# Patient Record
Sex: MALE | Race: WHITE | NOT HISPANIC OR LATINO | URBAN - METROPOLITAN AREA
[De-identification: names, ages, dates, MRNs, and addresses within clinical notes are randomized per-mention and may not be internally consistent; named-entity substitution may affect disease eponyms.]

---

## 2020-11-06 ENCOUNTER — IMPORTED ENCOUNTER (OUTPATIENT)
Dept: URBAN - METROPOLITAN AREA CLINIC 31 | Facility: CLINIC | Age: 65
End: 2020-11-06

## 2020-11-06 PROBLEM — H35.3131: Noted: 2020-11-06

## 2020-11-06 PROBLEM — H25.13: Noted: 2020-11-06

## 2020-11-06 PROCEDURE — 92015 DETERMINE REFRACTIVE STATE: CPT

## 2020-11-06 PROCEDURE — 99204 OFFICE O/P NEW MOD 45 MIN: CPT

## 2020-11-06 NOTE — PATIENT DISCUSSION
1.  NS Cataract OU: Discussed the risks benefits alternatives and limitations of cataract surgery including infection bleeding loss of vision retinal tears detachment. The patient stated a full understanding and a desire to proceed with the procedure in both eyes. Refractive options were reviewed. Patient has elected to be optimized for distance vision in both eyes. Toric LENS DISCUSSED FOR ASIGMATISIM. Surgical option not's not covered by insurance brochore to pt. The patient will still need glasses for reading and to possibly fine tune distance vision. CAN SCHEDULE CATARACT SURGERY OD/OStoric IOL discussed2. ARMD OU dry - Importance of smoking cessation blood pressure control and healthy diet were emphasized. In accordance with the AREDS study a good multivitamin containing EC and Zinc were recommened to be taken daily. Patient was instructed to self monitor their monocular vision (reading/[employer] Grid) at least weekly. Patient should immediately report any new onset of decreased vision or metamorphopsia. 3.  1. Refractive error: New glasses RX to pt change optional Astigmatism4. Followup on  for Admit. with Dr. Chio Angeles. 5.  Followup on 1 year for comprehensive exam. MRx and BAT. with Dr. Chio Angeles.

## 2022-04-02 ASSESSMENT — VISUAL ACUITY
OS_GLARE: 20/40MED
OS_CC: 20/40-1
OD_CC: 20/200
OD_GLARE: 20/40MED

## 2022-04-02 ASSESSMENT — TONOMETRY
OS_IOP_MMHG: 13
OD_IOP_MMHG: 13

## 2022-07-06 ENCOUNTER — ESTABLISHED PATIENT (OUTPATIENT)
Dept: URBAN - METROPOLITAN AREA CLINIC 29 | Facility: CLINIC | Age: 67
End: 2022-07-06

## 2022-07-06 DIAGNOSIS — H35.3131: ICD-10-CM

## 2022-07-06 DIAGNOSIS — H25.13: ICD-10-CM

## 2022-07-06 DIAGNOSIS — H52.203: ICD-10-CM

## 2022-07-06 PROCEDURE — 92014 COMPRE OPH EXAM EST PT 1/>: CPT

## 2022-07-06 PROCEDURE — 92015 DETERMINE REFRACTIVE STATE: CPT

## 2022-07-06 ASSESSMENT — VISUAL ACUITY
OS_CC: 20/20-2
OD_GLARE: 20/50
OU_CC: J1+
OD_SC: 20/80
OD_CC: 20/25-1
OS_SC: 20/70
OS_GLARE: 20/50

## 2022-07-06 ASSESSMENT — TONOMETRY
OD_IOP_MMHG: 12
OS_IOP_MMHG: 12

## 2022-07-30 ENCOUNTER — TELEPHONE ENCOUNTER (OUTPATIENT)
Age: 67
End: 2022-07-30

## 2022-07-31 ENCOUNTER — TELEPHONE ENCOUNTER (OUTPATIENT)
Age: 67
End: 2022-07-31

## 2023-04-06 ENCOUNTER — OFFICE VISIT (OUTPATIENT)
Dept: URBAN - METROPOLITAN AREA CLINIC 9 | Facility: CLINIC | Age: 68
End: 2023-04-06

## 2023-04-19 ENCOUNTER — WEB ENCOUNTER (OUTPATIENT)
Dept: URBAN - METROPOLITAN AREA CLINIC 9 | Facility: CLINIC | Age: 68
End: 2023-04-19

## 2023-04-25 ENCOUNTER — OFFICE VISIT (OUTPATIENT)
Dept: URBAN - METROPOLITAN AREA CLINIC 9 | Facility: CLINIC | Age: 68
End: 2023-04-25
Payer: COMMERCIAL

## 2023-04-25 ENCOUNTER — DASHBOARD ENCOUNTERS (OUTPATIENT)
Age: 68
End: 2023-04-25

## 2023-04-25 ENCOUNTER — WEB ENCOUNTER (OUTPATIENT)
Dept: URBAN - METROPOLITAN AREA CLINIC 9 | Facility: CLINIC | Age: 68
End: 2023-04-25

## 2023-04-25 VITALS
HEIGHT: 66 IN | BODY MASS INDEX: 26.03 KG/M2 | DIASTOLIC BLOOD PRESSURE: 84 MMHG | WEIGHT: 162 LBS | SYSTOLIC BLOOD PRESSURE: 140 MMHG

## 2023-04-25 DIAGNOSIS — Z12.11 SCREENING FOR COLON CANCER: ICD-10-CM

## 2023-04-25 DIAGNOSIS — K52.9 GASTROENTERITIS: ICD-10-CM

## 2023-04-25 PROCEDURE — 99203 OFFICE O/P NEW LOW 30 MIN: CPT | Performed by: INTERNAL MEDICINE

## 2023-04-25 RX ORDER — ROSUVASTATIN CALCIUM 10 MG/1
TABLET, FILM COATED ORAL
Qty: 90 TABLET | Status: ACTIVE | COMMUNITY

## 2023-04-25 RX ORDER — OXYCODONE HYDROCHLORIDE 10 MG/1
1 TABLET TABLET, FILM COATED, EXTENDED RELEASE ORAL
Status: ACTIVE | COMMUNITY
Start: 2023-04-25

## 2023-04-25 RX ORDER — OXYCODONE HYDROCHLORIDE AND ACETAMINOPHEN 10; 325 MG/1; MG/1
1 TABLET AS NEEDED TABLET ORAL
Status: ACTIVE | COMMUNITY
Start: 2023-04-25

## 2023-04-25 RX ORDER — IBUPROFEN AND FAMOTIDINE 800; 26.6 MG/1; MG/1
TABLET, COATED ORAL
Qty: 270 TABLET | Status: ACTIVE | COMMUNITY

## 2023-04-25 RX ORDER — SUMATRIPTAN SUCCINATE 50 MG/1
TABLET ORAL
Qty: 54 TABLET | Status: ACTIVE | COMMUNITY

## 2023-04-25 RX ORDER — CITALOPRAM 10 MG/1
1 TABLET TABLET, FILM COATED ORAL ONCE A DAY
Qty: 30 | Status: ACTIVE | COMMUNITY
Start: 2023-04-25

## 2023-04-25 RX ORDER — TAMSULOSIN HYDROCHLORIDE 0.4 MG/1
CAPSULE ORAL
Qty: 90 CAPSULE | Status: ACTIVE | COMMUNITY

## 2023-04-25 NOTE — HPI-TODAY'S VISIT:
Pt here for evaluation of IBS sx and also colon cancer screening. . Pt last colonoscopy over 5 years ago but pt was advised to have a repeat in 6 years. Records are no longer available as that GI office is closed. . Pt did note a few weeks ago he had some upper abd pain, crampy and bowel changes that resolved.  . 2023 BMp, CBC, lfts were reviewed.  2023 CT a/p renal with no GI abnormalities . I suspect he had a self limited gastroenteritis. We will order his colonoscopy but if neg resume an avg risk 10 year colon. He will notify me if any of the IBS sx return as they currently have fully recovered.  .

## 2023-05-01 ENCOUNTER — CLAIMS CREATED FROM THE CLAIM WINDOW (OUTPATIENT)
Dept: URBAN - METROPOLITAN AREA SURGERY CENTER 9 | Facility: SURGERY CENTER | Age: 68
End: 2023-05-01
Payer: COMMERCIAL

## 2023-05-01 DIAGNOSIS — K64.1 BLEEDING GRADE II HEMORRHOIDS: ICD-10-CM

## 2023-05-01 DIAGNOSIS — Z12.11 AVERAGE RISK FOR CRC. DUE TO PT'S CO-MORBID STATE WITH END STAGE DEMENTIA, HIGH RISK FOR ANESTHESIA (PER NEUROLOGY); INABILITY TO TAKE A BOWEL PREP....WOULD NOT ADVISE ANY COLORECTAL CANCER SCREENING INCLUDING STOOL TEST FOR FECAL BLOOD.: ICD-10-CM

## 2023-05-01 DIAGNOSIS — K57.30 ACQUIRED DIVERTICULOSIS OF COLON: ICD-10-CM

## 2023-05-01 PROCEDURE — G0121 COLON CA SCRN NOT HI RSK IND: HCPCS | Performed by: INTERNAL MEDICINE

## 2023-05-01 RX ORDER — ROSUVASTATIN CALCIUM 10 MG/1
TABLET, FILM COATED ORAL
Qty: 90 TABLET | Status: ACTIVE | COMMUNITY

## 2023-05-01 RX ORDER — CITALOPRAM 10 MG/1
1 TABLET TABLET, FILM COATED ORAL ONCE A DAY
Qty: 30 | Status: ACTIVE | COMMUNITY
Start: 2023-04-25

## 2023-05-01 RX ORDER — IBUPROFEN AND FAMOTIDINE 800; 26.6 MG/1; MG/1
TABLET, COATED ORAL
Qty: 270 TABLET | Status: ACTIVE | COMMUNITY

## 2023-05-01 RX ORDER — TAMSULOSIN HYDROCHLORIDE 0.4 MG/1
CAPSULE ORAL
Qty: 90 CAPSULE | Status: ACTIVE | COMMUNITY

## 2023-05-01 RX ORDER — OXYCODONE HYDROCHLORIDE AND ACETAMINOPHEN 10; 325 MG/1; MG/1
1 TABLET AS NEEDED TABLET ORAL
Status: ACTIVE | COMMUNITY
Start: 2023-04-25

## 2023-05-01 RX ORDER — OXYCODONE HYDROCHLORIDE 10 MG/1
1 TABLET TABLET, FILM COATED, EXTENDED RELEASE ORAL
Status: ACTIVE | COMMUNITY
Start: 2023-04-25

## 2023-05-01 RX ORDER — SUMATRIPTAN SUCCINATE 50 MG/1
TABLET ORAL
Qty: 54 TABLET | Status: ACTIVE | COMMUNITY

## 2023-07-07 ENCOUNTER — COMPREHENSIVE EXAM (OUTPATIENT)
Dept: URBAN - METROPOLITAN AREA CLINIC 29 | Facility: CLINIC | Age: 68
End: 2023-07-07

## 2023-07-07 DIAGNOSIS — H25.13: ICD-10-CM

## 2023-07-07 PROCEDURE — 92015 DETERMINE REFRACTIVE STATE: CPT

## 2023-07-07 PROCEDURE — 92014 COMPRE OPH EXAM EST PT 1/>: CPT

## 2023-07-07 ASSESSMENT — VISUAL ACUITY
OS_CC: 20/20-2
OS_SC: 20/50-3
OD_SC: 20/400
OS_CC: 20/20-1
OD_GLARE: 20/50
OD_CC: 20/20-3
OD_CC: 20/30
OS_GLARE: 20/50

## 2023-07-07 ASSESSMENT — TONOMETRY
OS_IOP_MMHG: 16
OD_IOP_MMHG: 17

## 2024-07-10 ENCOUNTER — COMPREHENSIVE EXAM (OUTPATIENT)
Dept: URBAN - METROPOLITAN AREA CLINIC 29 | Facility: CLINIC | Age: 69
End: 2024-07-10

## 2024-07-10 DIAGNOSIS — H35.3131: ICD-10-CM

## 2024-07-10 DIAGNOSIS — H25.13: ICD-10-CM

## 2024-07-10 PROCEDURE — 92134 CPTRZ OPH DX IMG PST SGM RTA: CPT

## 2024-07-10 PROCEDURE — 99214 OFFICE O/P EST MOD 30 MIN: CPT

## 2024-07-10 PROCEDURE — 92015 DETERMINE REFRACTIVE STATE: CPT

## 2024-07-10 RX ORDER — ERYTHROMYCIN 5 MG/G
OINTMENT OPHTHALMIC EVERY EVENING
Start: 2024-07-10

## 2024-07-10 RX ORDER — PREDNISOLONE ACETATE 10 MG/ML
1 SUSPENSION/ DROPS OPHTHALMIC
Start: 2024-07-10

## 2024-07-10 ASSESSMENT — VISUAL ACUITY
OD_CC: 20/20
OS_CC: 20/20

## 2024-07-10 ASSESSMENT — TONOMETRY
OS_IOP_MMHG: 14
OD_IOP_MMHG: 12

## 2024-07-25 ENCOUNTER — FOLLOW UP (OUTPATIENT)
Dept: URBAN - METROPOLITAN AREA CLINIC 29 | Facility: CLINIC | Age: 69
End: 2024-07-25

## 2024-07-25 DIAGNOSIS — H10.13: ICD-10-CM

## 2024-07-25 DIAGNOSIS — H25.13: ICD-10-CM

## 2024-07-25 PROCEDURE — 99214 OFFICE O/P EST MOD 30 MIN: CPT

## 2024-07-25 ASSESSMENT — TONOMETRY
OD_IOP_MMHG: 12
OS_IOP_MMHG: 12

## 2024-07-25 ASSESSMENT — VISUAL ACUITY
OS_CC: 20/20
OD_CC: 20/20